# Patient Record
Sex: MALE | Race: WHITE
[De-identification: names, ages, dates, MRNs, and addresses within clinical notes are randomized per-mention and may not be internally consistent; named-entity substitution may affect disease eponyms.]

---

## 2022-07-24 ENCOUNTER — HOSPITAL ENCOUNTER (INPATIENT)
Dept: HOSPITAL 94 - ER | Age: 57
LOS: 8 days | Discharge: HOME | DRG: 885 | End: 2022-08-01
Attending: PSYCHIATRY & NEUROLOGY | Admitting: PSYCHIATRY & NEUROLOGY
Payer: COMMERCIAL

## 2022-07-24 VITALS — HEIGHT: 67 IN | BODY MASS INDEX: 31.21 KG/M2 | WEIGHT: 198.86 LBS

## 2022-07-24 DIAGNOSIS — I10: ICD-10-CM

## 2022-07-24 DIAGNOSIS — F12.90: ICD-10-CM

## 2022-07-24 DIAGNOSIS — F31.2: Primary | ICD-10-CM

## 2022-07-24 DIAGNOSIS — Z20.822: ICD-10-CM

## 2022-07-24 DIAGNOSIS — F17.200: ICD-10-CM

## 2022-07-24 DIAGNOSIS — E78.5: ICD-10-CM

## 2022-07-24 LAB
ALBUMIN SERPL BCP-MCNC: 3.9 G/DL (ref 3.4–5)
ALBUMIN/GLOB SERPL: 0.9 {RATIO} (ref 1.1–1.5)
ALP SERPL-CCNC: 62 IU/L (ref 46–116)
ALT SERPL W P-5'-P-CCNC: 81 U/L (ref 12–78)
AMPHETAMINES UR QL SCN: NEGATIVE
ANION GAP SERPL CALCULATED.3IONS-SCNC: 16 MMOL/L (ref 8–16)
AST SERPL W P-5'-P-CCNC: 86 U/L (ref 10–37)
BACTERIA URNS QL MICRO: (no result) /HPF
BARBITURATES UR QL SCN: NEGATIVE
BASOPHILS # BLD AUTO: 0.1 X10'3 (ref 0–0.2)
BASOPHILS NFR BLD AUTO: 1.1 % (ref 0–1)
BENZODIAZ UR QL SCN: NEGATIVE
BILIRUB SERPL-MCNC: 1.5 MG/DL (ref 0.1–1)
BUN SERPL-MCNC: 13 MG/DL (ref 7–18)
BUN/CREAT SERPL: 11.1 (ref 5.4–32)
BZE UR QL SCN: NEGATIVE
CALCIUM SERPL-MCNC: 9.6 MG/DL (ref 8.5–10.1)
CANNABINOIDS UR QL SCN: POSITIVE
CAOX CRY URNS QL MICRO: (no result) /HPF
CHLORIDE SERPL-SCNC: 106 MMOL/L (ref 99–107)
CLARITY UR: (no result)
CO2 SERPL-SCNC: 22.3 MMOL/L (ref 24–32)
COLOR UR: (no result)
CREAT SERPL-MCNC: 1.17 MG/DL (ref 0.6–1.1)
DEPRECATED SQUAMOUS URNS QL MICRO: (no result) /LPF
EOSINOPHIL # BLD AUTO: 0.1 X10'3 (ref 0–0.9)
EOSINOPHIL NFR BLD AUTO: 1.1 % (ref 0–6)
ERYTHROCYTE [DISTWIDTH] IN BLOOD BY AUTOMATED COUNT: 14 % (ref 11.5–14.5)
ETHANOL SERPL-MCNC: < 0.01 GM/DL (ref 0–0.01)
GFR SERPL CREATININE-BSD FRML MDRD: 64 ML/MIN
GLUCOSE SERPL-MCNC: 107 MG/DL (ref 70–104)
GLUCOSE UR STRIP-MCNC: NEGATIVE MG/DL
HCT VFR BLD AUTO: 46.6 % (ref 42–52)
HGB BLD-MCNC: 16.5 G/DL (ref 14–17.9)
HGB UR QL STRIP: NEGATIVE
HYALINE CASTS URNS QL MICRO: (no result) /LPF
KETONES UR STRIP-MCNC: 15 MG/DL
LEUKOCYTE ESTERASE UR QL STRIP: (no result)
LYMPHOCYTES # BLD AUTO: 2.6 X10'3 (ref 1.1–4.8)
LYMPHOCYTES NFR BLD AUTO: 30.2 % (ref 21–51)
MCH RBC QN AUTO: 35.6 PG (ref 27–31)
MCHC RBC AUTO-ENTMCNC: 35.3 G/DL (ref 33–36.5)
MCV RBC AUTO: 100.7 FL (ref 78–98)
METHADONE UR QL SCN: NEGATIVE
MONOCYTES # BLD AUTO: 1.2 X10'3 (ref 0–0.9)
MONOCYTES NFR BLD AUTO: 13.4 % (ref 2–12)
MUCOUS THREADS URNS QL MICRO: (no result) /LPF
NEUTROPHILS # BLD AUTO: 4.7 X10'3 (ref 1.8–7.7)
NEUTROPHILS NFR BLD AUTO: 54.2 % (ref 42–75)
NITRITE UR QL STRIP: NEGATIVE
OPIATES UR QL SCN: NEGATIVE
PCP UR QL SCN: NEGATIVE
PH UR STRIP: 6 [PH] (ref 4.8–8)
PLATELET # BLD AUTO: 207 X10'3 (ref 140–440)
PMV BLD AUTO: 7.7 FL (ref 7.4–10.4)
POTASSIUM SERPL-SCNC: 3.5 MMOL/L (ref 3.5–5.1)
PROT SERPL-MCNC: 8.1 G/DL (ref 6.4–8.2)
PROT UR QL STRIP: 30 MG/DL
RBC # BLD AUTO: 4.62 X10'6 (ref 4.7–6.1)
RBC #/AREA URNS HPF: (no result) /HPF (ref 0–2)
SODIUM SERPL-SCNC: 144 MMOL/L (ref 135–145)
SP GR UR STRIP: 1.02 (ref 1–1.03)
URN COLLECT METHOD CLASS: (no result)
UROBILINOGEN UR STRIP-MCNC: 2 E.U/DL (ref 0.2–1)
WBC # BLD AUTO: 8.6 X10'3 (ref 4.5–11)
WBC #/AREA URNS HPF: (no result) /HPF (ref 0–4)

## 2022-07-24 PROCEDURE — 86592 SYPHILIS TEST NON-TREP QUAL: CPT

## 2022-07-24 PROCEDURE — 81001 URINALYSIS AUTO W/SCOPE: CPT

## 2022-07-24 PROCEDURE — 80053 COMPREHEN METABOLIC PANEL: CPT

## 2022-07-24 PROCEDURE — 87081 CULTURE SCREEN ONLY: CPT

## 2022-07-24 PROCEDURE — 84443 ASSAY THYROID STIM HORMONE: CPT

## 2022-07-24 PROCEDURE — 80305 DRUG TEST PRSMV DIR OPT OBS: CPT

## 2022-07-24 PROCEDURE — 80320 DRUG SCREEN QUANTALCOHOLS: CPT

## 2022-07-24 PROCEDURE — 36415 COLL VENOUS BLD VENIPUNCTURE: CPT

## 2022-07-24 PROCEDURE — 82565 ASSAY OF CREATININE: CPT

## 2022-07-24 PROCEDURE — 83036 HEMOGLOBIN GLYCOSYLATED A1C: CPT

## 2022-07-24 PROCEDURE — 85025 COMPLETE CBC W/AUTO DIFF WBC: CPT

## 2022-07-24 PROCEDURE — 80061 LIPID PANEL: CPT

## 2022-07-24 PROCEDURE — 99285 EMERGENCY DEPT VISIT HI MDM: CPT

## 2022-07-24 PROCEDURE — 99284 EMERGENCY DEPT VISIT MOD MDM: CPT

## 2022-07-24 PROCEDURE — 96372 THER/PROPH/DIAG INJ SC/IM: CPT

## 2022-07-24 NOTE — NUR
pt brother elena nolan at bedside. would like to be contacted when pt sees 
psychitrist to help plan of care. [phone] is brothers cell number. pt gave 
permission to give brother updates. per brother and pt, no updates or 
information to be given to pt wife Swati nolan

## 2022-07-24 NOTE — NUR
The patient moved to bed 23 from the main ER. He did give a urine sample and is 
resting on his bed currently.

## 2022-07-25 VITALS — SYSTOLIC BLOOD PRESSURE: 120 MMHG | DIASTOLIC BLOOD PRESSURE: 69 MMHG

## 2022-07-25 NOTE — NUR
Admission Note: Parish Laguerre (Juventino)



58 y/o male, (prefers to be called Juventino) admitted to [facility] 7/25/2022 on 5150 for GD. Patient 
used to abuse alcohol and drugs but has been sober awhile per family report. Pt reportedly 
has Hx of schizophrenia and supposedly had psychotic break 4 days ago per brother.Patient 
originally found by brother in [facility]. The family was trying to receive help 
for the patient when he suddenly took off. Patient was then located in [place] and 
brought back to [place] by brother. Per ED report the patient was in a game and trying to 
reach the next level. The pt was presented to the ED and put on observation. The patient is 
hyperverbal, delusional, paranoid,  tangental with flight of ideas. The pt is a poor 
historian and doesn't know why he was in [place] or [place] [facility] [place]. When answering questions the patient quickly rambles on and does not make 
sense. The patient reports being  but is actually . Per the pts brother he 
would like to be contacted and involved in health care process.

## 2022-07-25 NOTE — NUR
The patient have been very hyperverbal and manic. He is constantly moving about 
the unit. He is labile in his mood. He was very irritable and argumentative 
during the evening assessment. Discussed presentation with PA and orders 
received for medications which the patient accepted. He has been accepted for 
admit to [facility] and he was made aware.

## 2022-07-25 NOTE — NUR
family is allowed to visit pt daughters and brothers. wife is not allowed to 
visit or receive information

## 2022-07-26 VITALS — SYSTOLIC BLOOD PRESSURE: 140 MMHG | DIASTOLIC BLOOD PRESSURE: 76 MMHG

## 2022-07-26 VITALS — DIASTOLIC BLOOD PRESSURE: 78 MMHG | SYSTOLIC BLOOD PRESSURE: 117 MMHG

## 2022-07-26 LAB
CHOLEST SERPL-MCNC: 112 MG/DL (ref 0–200)
CHOLEST/HDLC SERPL: 3.3 {RATIO} (ref 0–4.99)
CREAT SERPL-MCNC: 0.78 MG/DL (ref 0.6–1.1)
GFR SERPL CREATININE-BSD FRML MDRD: > 90 ML/MIN
HBA1C MFR BLD: 5.4 % (ref 4.5–6.2)
HDLC SERPL-MCNC: 34 MG/DL (ref 35–60)
LDLC SERPL DIRECT ASSAY-MCNC: 68 MG/DL (ref 50–100)
TRIGL SERPL-MCNC: 53 MG/DL (ref 20–135)

## 2022-07-26 NOTE — NUR
Info from Brother:

      Pts brother Marvin gave this info.  Pt behaves like he has "Multiple personalities". 
Parish feels People are to get him. He has a jernigan in his mind.  Pt has been diagnosed with 
Schizoaffective.  Pt had a breakdown in 2020 and was hospitalized twice that year.  Pt talks 
about  how he feels he is in a different dimension or feels like he is not in a real realm.  
Pt wrote a book full of information about him.  Pts mother had Bipolar and was hospitalized 
while they were younger.

## 2022-07-26 NOTE — NUR
Nursing Progress Note: 



Problem: 58 y/o male, (prefers to be called Juventino) admitted to [facility] 7/25/2022 on 5150 for GD. 
Patient used to abuse alcohol and drugs, but has been sober awhile per family report. Pt 
reportedly has Hx of schizophrenia and supposedly had psychotic break 4 days ago per 
brother. Patient originally found by brother in [facility]. The family was 
trying to receive help for the patient when he suddenly took off.



Intervention:  Medication given as ordered.  Provided with a safe and therapeutic 
environment, clear communication, active listening and positive encouragement. Medication 
administration as per ordered with no adverse effect. Q15 min checks.



Response: Patient was up early this morning. He appears to be a little manic right now. 
Patient is always busy. He moves freely about the unit and the rec room all day. Patient is 
somewhat intrusive, but not bothersome. His mood is elevated and he seems quite happy. 
Patient does not want to talk about his issues leading him to be admitted here. He has no 
scheduled meds, and knows he has PRNs if needed. Patient is very sociable and has to meet 
everybody on the unit. Hes sort of like a little boy. Patient can be irritable when he 
doesnt get his way. 



Plan:  Patient continues to require crisis interruption and stabilization with medication 
management and monitoring in a safe and therapeutic environment

## 2022-07-27 VITALS — DIASTOLIC BLOOD PRESSURE: 75 MMHG | SYSTOLIC BLOOD PRESSURE: 130 MMHG

## 2022-07-27 VITALS — DIASTOLIC BLOOD PRESSURE: 82 MMHG | SYSTOLIC BLOOD PRESSURE: 158 MMHG

## 2022-07-27 NOTE — NUR
Met with Juventino's brother, Marvin, and his son Sidney, today after they visited Juventino. They both had 
concerns about Juventino getting discharged when his 5150 expires. They would like to be involved 
with his discharge plan.



Marvin-ph# [phone]

Sidney-ph# [phone]



FIDEL Elam

## 2022-07-27 NOTE — NUR
Juventino is a 56 y/o   male who was placed on 5150 for grave 

disability. He was found running around [facility] in his boxers. Family had 

brought him to [facility] ED for a mental health evaluation. He took off and was 

dressed only in boxers to try to disguise himself as a homeless person. 

Per family, Juvetnino has been under a lot of stress over the last couple months 

with court with his wife whom he has a restraining order against. Juventino is 

conflicted about getting a divorce due to his Druze beliefs. He 

recently took off to [facility]  where his brother, Marvin, had to 

drive there to retrieve him. He told his brother he felt like he was in a 

"game". Per family report, Juventino had been hearing  family members 

talk to him. He has been acting erractic and asked his kids to come to the 

house and then yelled at them and trashed his house. He has been spending 

money excessively and family is concerned he has not paid his bills.



Per family, Juventino was diagnosed with Schizoaffective Disorder two years ago, 

however, did not comply with treatment medications. Juventino's brother, Marvin, 

reported Juventino recently went to [facility] and did testing. 

They are not sure of the results. Juventino does see a therapist, CELSA Ghotra, PhD, LMFT, who recommended he go to [facility]. Marvin 

reported Juventino was self-medicating with alcohol and marijuana. He quit 

drinking about two months ago. Juventino has 7 kids, [place] [facility]. He also did own 7 businesses in the past. 



Juventino was labile and hyper-verbal when writer met with him.He stated, 

"I flipped out" when asked why he is here. He reported a lot of stress 

and feeling conflicted about his divorce. He really would like to leave 

and feels like he doesn't belong here at [facility]. 



Family would like to be involved with discharge planning. They are quite

supportive.



FIDEL Elam

-------------------------------------------------------------------------------

Addendum: 22 at 1545 by Maryellen OROZCO

-------------------------------------------------------------------------------

Amended: Links added.

## 2022-07-27 NOTE — NUR
Nursing Progress Note: 



Problem: 58 y/o male, (prefers to be called Juventino) admitted to [facility] 7/25/2022 on 5150 for GD. 
Patient used to abuse alcohol and drugs, but has been sober awhile per family report. Pt 
reportedly has Hx of schizophrenia and supposedly had psychotic break 4 days ago per 
brother. Patient originally found by brother in [facility]. The family was 
trying to receive help for the patient when he suddenly took off.



Intervention:  Medication given as ordered.  Provided with a safe and therapeutic 
environment, clear communication, active listening and positive encouragement. Medication 
administration as per ordered with no adverse effect. Q15 min checks.



Response: Patient dancing around in hallway greeting people at shift change. The pt is 
polite and appropriate. Hypo manic this evening. The patient reports not wanting to go to 
group because, "I don't want to hurt any ones feelings because I know things. These people 
and me are different, I know things." Patient continued to be hyperverbal but appropriate 
throughout the evening. Ate snack in the community room. patient took new order of Lithium. 
Patient asked for room change after roommate frightened him by talking to self.  Patient 
calmed down and hung out in rec room until room mate eventually fell asleep. Patient then 
tried to go to bed but came back out reporting that he couldn't sleep and that he felt like 
he had a million gallons of energy. Patient was given headphones and charge nurse called 
provider an order of Restoril was given . Patient then reported that he felt he was having a 
paradoxical effect. Patient did not want second dose. Pt then put on headphone and danced in 
the hallway for a long time.



Plan:  Patient continues to require crisis interruption and stabilization with medication 
management and monitoring in a safe and therapeutic environment

## 2022-07-27 NOTE — NUR
Nursing Progress Note: 



Problem: 56 y/o male, (prefers to be called Juventino) admitted to [facility] 7/25/2022 on 5150 for GD. 
Patient used to abuse alcohol and drugs but has been sober awhile per family report. Pt 
reportedly has Hx of schizophrenia and supposedly had psychotic break 4 days ago per 
brother. Patient originally found by brother in [facility]. The family was 
trying to receive help for the patient when he suddenly took off.



Intervention:  Medication given as ordered.  Provided with a safe and therapeutic 
environment, clear communication, active listening and positive encouragement. Medication 
administration as per ordered with no adverse effect. 



Response: Patient was up before start of shift. Hes still somewhat manic with elevated 
mood. Patient continues to be all over the unit talking to everybody. He has no scheduled 
meds during the day, and has not requested any PRNs. Patients brother visited today and 
brought the patient some new clothes. He is showing insight into his illness, I know I have 
Bipolar, even if it has not been diagnosed. I also realize Im manic right now. The longer 
he speaks, the more he goes off the rails. He seems to be happy here, and has not asked to 
leave.



Plan:  Patient continues to require crisis interruption and stabilization with medication 
management and monitoring in a safe and therapeutic environment

## 2022-07-27 NOTE — NUR
Nursing Progress Note: Parish 



Problem: 58 y/o male, (prefers to be called Juventino) admitted to [facility] 7/25/2022 on 5150 for GD. 
Patient used to abuse alcohol and drugs but has been sober awhile per family report. Pt 
reportedly has Hx of schizophrenia and supposedly had psychotic break 4 days ago per 
brother. Patient originally found by brother in [facility]. The family was 
trying to receive help for the patient when he suddenly took off.



Intervention:  Medication given as ordered.  Provided with a safe and therapeutic 
environment, clear communication, active listening and positive encouragement. Medication 
administration as per ordered with no adverse effect. 



Response: Patient up pacing the unit at change of shift.  Pt hypomanic, cooperative with 
assessment.  He denies MH symptoms but states she has had this problem for years and he was 
able to hide it very well.  He states I got caught and wrote a book about it.  Pt 
observed in rec room listening to headphones.  HS medications taken without issue.  Pt had 
periods of lying in bed and pacing the unit with headphones. 



Plan:  Patient continues to require crisis interruption and stabilization with medication 
management and monitoring in a safe and therapeutic environment

## 2022-07-28 VITALS — SYSTOLIC BLOOD PRESSURE: 124 MMHG | DIASTOLIC BLOOD PRESSURE: 74 MMHG

## 2022-07-28 VITALS — SYSTOLIC BLOOD PRESSURE: 134 MMHG | DIASTOLIC BLOOD PRESSURE: 62 MMHG

## 2022-07-28 NOTE — NUR
Sleep Note:

Pt up pacing the unit at 0145 wanting to be in the rec room.  Told the pt the room was off 
limits, the pt wanted to sit in the hallway "for an hour and a half."  The patient refused 
second dose of restoril stating "it will make me bounce off the walls."

## 2022-07-28 NOTE — NUR
Initial: Pt admit for bipolar disorder. Currently on a regular diet with slightly 
fluctuating PO intake that appears to be improving, initially refusing first two meals 
however with mostly % PO intake the following meals meeting estimated nutrient needs. 
Pt participates in snacks per RN documentation. LB 7/25 though no GI symptoms and PRN bowel 
care available. No nutrition diagnosis at this time. Will continue to follow.

Recommendations:

1) Continue regular diet

2) Bowel care PRN

3) Weekly scaled weights

-------------------------------------------------------------------------------

Addendum: 07/28/22 at 1020 by Nicole Uriarte RD

-------------------------------------------------------------------------------

Amended: Links added.

## 2022-07-28 NOTE — NUR
Nursing Progress Note: 



Problem: 58 y/o male, (prefers to be called Juventino) admitted to [facility] 7/25/2022 on 5150 for GD. 
Patient used to abuse alcohol and drugs but has been sober awhile per family report. Pt 
reportedly has Hx of schizophrenia and supposedly had psychotic break 4 days ago per 
brother. Patient originally found by brother in [facility]. The family was 
trying to receive help for the patient when he suddenly took off.



Intervention:  Physical assessment and 1:1 interview done. Pt. provided with a safe and 
therapeutic environment, clear communication, active listening and positive encouragement. 
Medication administration given as ordered with no adverse effect. 



Response: Patient was up before start of shift and sitting in the hallway listening to 
music. Pt. states, I only need 7 hours of sleep, If I sleep more than that its not good. 
Pt. appears hypomanic, observed dancing in the hallway. During interview, pt. talked at 
length of his reason for admission, stating, I started seeing things that werent there, my 
brother told me I needed some help. Pt. denies all psych symptoms. Pt. states, I just want 
to get home, so I can wake up and play my tunes in the morning. Pt. feels like his 
medications are helping him and denies side effects. 



Plan:  Patient continues to require crisis interruption and stabilization with medication 
management and monitoring in a safe and therapeutic environment.

## 2022-07-29 VITALS — SYSTOLIC BLOOD PRESSURE: 138 MMHG | DIASTOLIC BLOOD PRESSURE: 75 MMHG

## 2022-07-29 VITALS — DIASTOLIC BLOOD PRESSURE: 84 MMHG | SYSTOLIC BLOOD PRESSURE: 118 MMHG

## 2022-07-29 NOTE — NUR
Nursing Progress Note: Parish



Problem: 56 y/o male, (prefers to be called Juventino) admitted to [facility] 7/25/2022 on 5150 for GD. 
Patient used to abuse alcohol and drugs but has been sober awhile per family report. Pt 
reportedly has Hx of schizophrenia and supposedly had psychotic break 4 days ago per 
brother. Patient originally found by brother in [facility]. The family was 
trying to receive help for the patient when he suddenly took off.



Intervention:  Physical assessment and 1:1 interview done. Pt. provided with a safe and 
therapeutic environment, clear communication, active listening and positive encouragement. 
Medication administration given as ordered with no adverse effect. 



Response: Patient talking with Mika at change of shift.  Pt was served his 5250 without any 
issues and repeated I just want to get out of here.  Pt pacing the hallways with 
headphones on.  Observed to be in the community room for snacks, HS medications given along 
with PRN restoril.  He thinks the restoril is giving him the opposite effect of sleeping and 
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  

Pt pacing the unit with headphones on, refusing repeat dose of restoril.  



Plan:  Patient continues to require crisis interruption and stabilization with medication 
management and monitoring in a safe and therapeutic environment.

## 2022-07-30 VITALS — DIASTOLIC BLOOD PRESSURE: 55 MMHG | SYSTOLIC BLOOD PRESSURE: 107 MMHG

## 2022-07-30 VITALS — SYSTOLIC BLOOD PRESSURE: 139 MMHG | DIASTOLIC BLOOD PRESSURE: 71 MMHG

## 2022-07-30 NOTE — NUR
Nursing Progress Note: Parish



Problem: 58 y/o male, (prefers to be called Juventino) admitted to [facility] 7/25/2022 on 5150 for GD. 
Patient used to abuse alcohol and drugs but has been sober awhile per family report. Pt 
reportedly has Hx of schizophrenia and supposedly had psychotic break 4 days ago per 
brother. Patient originally found by brother in [facility]. The family was 
trying to receive help for the patient when he suddenly took off.



Intervention:  Physical assessment and 1:1 interview done. Pt. provided with a safe and 
therapeutic environment, clear communication, active listening and positive encouragement. 
Medication administration given as ordered with no adverse effect. 



Response: Patient was quiet this evening keeping to himself in the community room, watching 
TV.  Pt states he is doing well and is looking forward to getting a good nights sleep.  Pt 
denies MH symptoms.  Pt up in the community room for snacks and took HS medications 
including PRN trazadone.  Pt watched TV until bedtime and as of this writing the pt is 
sleeping in his room.  



Plan:  Patient continues to require crisis interruption and stabilization with medication 
management and monitoring in a safe and therapeutic environment.

## 2022-07-30 NOTE — NUR
Nursing Progress Note: Parish Jauregui



Problem: 

Patient admitted on 5150 for GD. Patient used to abuse alcohol and drugs but has been sober 
a while per family report. Pt reportedly has Hx of schizophrenia and supposedly had 
psychotic break 4 days ago per brother. Patient originally found by brother in [facility]. The family was trying to receive help for the patient when he suddenly took 
off.



Intervention:  

Provided with a safe and therapeutic environment, clear communication, active listening and 
positive encouragement. 



Response:

Patient is resting quietly in bed at the start of the shift.  Cooperative with assessment.  
No medication is scheduled this shift and no PRNs are required.  Patient concerns himself 
over other patient needs and can be intrusive when trying to help.  He is easily redirected 
and is pleasant/ polite.  Appears restless at times.  He paces the unit and can be hyper 
verbal.  Patient denies any mental health symptoms at this time.   



Plan:  

Patient continues to require crisis interruption and stabilization with medication 
management and monitoring in a safe and therapeutic environment.

## 2022-07-31 VITALS — SYSTOLIC BLOOD PRESSURE: 109 MMHG | DIASTOLIC BLOOD PRESSURE: 59 MMHG

## 2022-07-31 VITALS — DIASTOLIC BLOOD PRESSURE: 68 MMHG | SYSTOLIC BLOOD PRESSURE: 111 MMHG

## 2022-07-31 NOTE — NUR
Nursing note

Problem:

The patient was admitted on a 5150 for grave disability after he had became acutely manic 
and could not care for himself. He was found by family running naked downtown and taken to 
the ER. He was psychotic, manic and clearly unable to care for himself.

Interventions

One to one with the patient to assess for severity of manic symptoms. Medication education 
and assessed for side effects to medications. Physical assessment completed. He is on q 15 
minute safety checks. Assessed for psychotic symptom. 

Response: 

The patient was cooperative with the evening assessment but his replies were circumstantial 
at times. He described his mood as "happy" and he denies that he is having thoughts to harm 
himself or anyone else. He denies that he is having psychotic symptoms and none were evident 
during the evening assessment. He stated that is racing thoughts are less than on admit but 
then went on to state that he had "extra powerful thoughts" He has been up on the unit and 
social with others but not intrusive" He reports that his sleep has improved. He described 
his ability to focus "Way better"

Plan:

Continue current treatment and redirect patient from any inappropriate behaviors as needed.

## 2022-07-31 NOTE — NUR
Nursing Progress Note: Parish Jauregui



Problem: 

Patient admitted on 5150 for GD. Patient used to abuse alcohol and drugs but has been sober 
a while per family report. Pt reportedly has Hx of schizophrenia and supposedly had 
psychotic break 4 days ago per brother. Patient originally found by brother in [facility]. The family was trying to receive help for the patient when he suddenly took 
off.



Intervention:  

Provided with a safe and therapeutic environment, clear communication, active listening and 
positive encouragement. 



Response:

Patient is resting quietly in bed at the start of the shift.  Cooperative with assessment.  
No medications are scheduled this shift.  Patient is pleasant and cooperative.  He appears 
somewhat elevated/ animated while socializing with staff and peers.  Denies all mental 
health symptoms at this time.  



Plan:  

Patient continues to require crisis interruption and stabilization with medication 
management and monitoring in a safe and therapeutic environment.

## 2022-08-01 VITALS — SYSTOLIC BLOOD PRESSURE: 111 MMHG | DIASTOLIC BLOOD PRESSURE: 61 MMHG

## 2022-08-01 NOTE — NUR
RN DISCHARGE NOTE



Patient was discharged from unit at 1540. Pt was picked up by his son Hamlet and transported 
home. Pt left with all personal belongings Discharge instructions were reviewed with patient 
and he verbalized understanding. Pt will follow up with his PCP [facility] and 
with his psychologist, Jakob Ghotra, PhD. Pt is A&O x4.

## 2022-08-01 NOTE — NUR
DISCHARGE PLAN



Juventino will be discharging home today. He will follow up with [facility] and with 
his psychologist, Jakob Ghotra, PhD. His son, Hamlet, will pick him up upon discharge. Hamlet is 
looking into some longer term treatment facilities and Juventino reported he is agreeable to this.



FIDEL Elam

## 2022-08-01 NOTE — NUR
Nursing Progress Note: Parish Jauregui



Problem: 

Patient admitted on 5150 for GD. Patient used to abuse alcohol and drugs but has been sober 
a while per family report. Pt reportedly has Hx of schizophrenia and supposedly had 
psychotic break 4 days ago per brother. Patient originally found by brother in [facility]. The family was trying to receive help for the patient when he suddenly took 
off.



Intervention:  

Provided with a safe and therapeutic environment, clear communication, active listening and 
positive encouragement. 



Response:

Pt was watching tv w/peers at change of shift. Pt states he is feeling better now and that 
he plans to go home tomorrow because his children are staying at his house. Pt does not have 
a viable plan to return home yet.  Pt reports sleep continues to be interrupted and is aware 
that trazodone is available as a prn but he doesn't want to take it. Pt had snack and took 
HS meds before going to bed. Pt woke during the night and sat outside his room writing for a 
short time before going back to bed. 



Plan:  

Patient continues to require crisis interruption and stabilization with medication 
management and monitoring in a safe and therapeutic environment.


-------------------------------------------------------------------------------

Addendum: 08/01/22 at 0436 by Ellen Chun RN

-------------------------------------------------------------------------------

pt woke approx 2-3 times during the evening and walked out from his room and then returned. 
Pt declined trazodone prn offered.

## 2022-10-04 ENCOUNTER — HOSPITAL ENCOUNTER (EMERGENCY)
Dept: HOSPITAL 94 - ER | Age: 57
Discharge: HOME | End: 2022-10-04
Payer: COMMERCIAL

## 2022-10-04 VITALS — WEIGHT: 176.37 LBS | HEIGHT: 69 IN | BODY MASS INDEX: 26.12 KG/M2

## 2022-10-04 VITALS — SYSTOLIC BLOOD PRESSURE: 159 MMHG | DIASTOLIC BLOOD PRESSURE: 91 MMHG
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
## 2022-10-04 DIAGNOSIS — S30.811A: Primary | ICD-10-CM
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
## 2022-10-04 PROCEDURE — 82948 REAGENT STRIP/BLOOD GLUCOSE: CPT

## 2022-10-04 PROCEDURE — 99283 EMERGENCY DEPT VISIT LOW MDM: CPT
